# Patient Record
Sex: FEMALE | Race: OTHER | Employment: UNEMPLOYED | URBAN - METROPOLITAN AREA
[De-identification: names, ages, dates, MRNs, and addresses within clinical notes are randomized per-mention and may not be internally consistent; named-entity substitution may affect disease eponyms.]

---

## 2017-02-28 ENCOUNTER — OFFICE VISIT (OUTPATIENT)
Dept: FAMILY MEDICINE CLINIC | Age: 32
End: 2017-02-28

## 2017-02-28 DIAGNOSIS — Z71.3 DIETARY COUNSELING AND SURVEILLANCE: Primary | ICD-10-CM

## 2017-02-28 NOTE — PROGRESS NOTES
Ms. Reyna Beltrán was seen for f/u wt loss nutrition education. No  used for this appt. Current weight 255 lbs (RD scale)  Walking 3 days for 40 minutes. Pt says her heart rate does increase on these walks  24 hour recall performed. Pt eating 5 times/day. CHO heavy  Emphasized importance of variety and again RD encouraged Ms. Reyna Beltrán to keep a food journal for a 3 days to get an idea of where she can improve her meals/snacks. RD provided example of adding spinach to her mozzarella quesadilla and always having vegetable at dinner. Pt is drinking water that has chopped eggplant in it as she was told by a relative that it helps with fat burning. RD provided praise to pt as she has increased her calorie intake but now needs to tweak balance between food groups. Goals:   Using [employer] placemat as a guide, pt will aim for at least 2 servings veggies/day  Pt will add 1 more day of walking to current exercise regimen.

## 2017-04-26 ENCOUNTER — OFFICE VISIT (OUTPATIENT)
Dept: FAMILY MEDICINE CLINIC | Age: 32
End: 2017-04-26

## 2017-04-26 DIAGNOSIS — L71.9 ROSACEA: ICD-10-CM

## 2017-04-26 DIAGNOSIS — L70.9 ACNE, UNSPECIFIED ACNE TYPE: Primary | ICD-10-CM

## 2020-03-05 ENCOUNTER — OFFICE VISIT (OUTPATIENT)
Dept: URGENT CARE | Age: 35
End: 2020-03-05

## 2020-03-05 VITALS — WEIGHT: 254 LBS | HEIGHT: 65 IN | BODY MASS INDEX: 42.32 KG/M2

## 2020-03-05 RX ORDER — ONDANSETRON 4 MG/1
4 TABLET, FILM COATED ORAL
COMMUNITY

## 2020-03-05 RX ORDER — HYDROCODONE BITARTRATE AND ACETAMINOPHEN 5; 325 MG/1; MG/1
TABLET ORAL
COMMUNITY
End: 2020-06-12

## 2020-03-11 ENCOUNTER — OFFICE VISIT (OUTPATIENT)
Dept: SURGERY | Age: 35
End: 2020-03-11

## 2020-03-11 VITALS
RESPIRATION RATE: 16 BRPM | HEART RATE: 78 BPM | OXYGEN SATURATION: 99 % | HEIGHT: 65 IN | WEIGHT: 253 LBS | SYSTOLIC BLOOD PRESSURE: 144 MMHG | TEMPERATURE: 98.5 F | BODY MASS INDEX: 42.15 KG/M2 | DIASTOLIC BLOOD PRESSURE: 98 MMHG

## 2020-03-11 DIAGNOSIS — K80.20 CALCULUS OF GALLBLADDER WITHOUT CHOLECYSTITIS WITHOUT OBSTRUCTION: Primary | ICD-10-CM

## 2020-03-11 PROBLEM — E66.01 CLASS 3 SEVERE OBESITY IN ADULT (HCC): Status: ACTIVE | Noted: 2020-03-11

## 2020-03-11 NOTE — PROGRESS NOTES
Cruzito Leigh is a 38 y.o. female who presents for evaluation of symptomatic cholelithiasis. Information obtained from patient via blue phone . Ms. Jewell Handler tells me that she has been experiencing intermittent abdominal pain for some time now. Pain localized to the epigastrium and occurs after meals. The episodes of pain are becoming more frequent and more severe. Associated abdominal bloating and nausea. No chest pain or shortness of breath. No clear h/o michelle colored stool or tea colored urine. She has otherwise been in her usual state of health. Recently seen in ER where she was found to have cholelithiasis. (Films or report not available.)    Past Medical History:   Diagnosis Date    Calculus of gallbladder without cholecystitis without obstruction 3/11/2020    Class 3 severe obesity in adult [facility]) 3/11/2020     History reviewed. No pertinent surgical history. History reviewed. No pertinent family history. Social History     Socioeconomic History    Marital status:      Spouse name: Not on file    Number of children: Not on file    Years of education: Not on file    Highest education level: Not on file   Tobacco Use    Smoking status: Never Smoker    Smokeless tobacco: Never Used   Substance and Sexual Activity    Alcohol use: No    Drug use: No     Review of systems negative except as noted. Review of Systems   Respiratory: Negative for shortness of breath. Cardiovascular: Negative for chest pain. Gastrointestinal: Positive for abdominal pain (Epigastric.) and nausea. Abdominal bloating. No clear h/o michelle colored stool. Genitourinary: Negative for dysuria and hematuria. No clear h/o tea colored urine. Musculoskeletal: Positive for back pain. Physical Exam  Vitals signs reviewed. Constitutional:       General: She is not in acute distress. Appearance: Normal appearance. She is obese.    HENT:      Head: Normocephalic and atraumatic. Eyes:      General: No scleral icterus. Neck:      Musculoskeletal: Neck supple. Cardiovascular:      Rate and Rhythm: Normal rate and regular rhythm. Pulmonary:      Effort: Pulmonary effort is normal.      Breath sounds: Normal breath sounds. Abdominal:      General: There is no distension. Palpations: Abdomen is soft. Tenderness: There is no abdominal tenderness. There is no guarding or rebound. Musculoskeletal: Normal range of motion. Lymphadenopathy:      Cervical: No cervical adenopathy. Neurological:      General: No focal deficit present. Mental Status: She is alert. ASSESSMENT and PLAN  In view of the findings on H and P, believe that Ms. Getachew Webster's symptoms are due to symptomatic cholelithiasis. Do not have the ultrasound film or report available at this time. Explained to Ms. Nila Mi, via the blue phone , that I need to at least see the ultrasound report before scheduling surgery. Will try to obtain records from outside hospital and will see Ms. Nila Mi next week to discuss/schedule surgery. Discussed plan with her via the blue phone  and she is agreeable.       CC: Soledad Manzano MD

## 2020-03-11 NOTE — PROGRESS NOTES
1. Have you been to the ER, urgent care clinic since your last visit? Hospitalized since your last visit? Yes [facility] ER abd pain    2. Have you seen or consulted any other health care providers outside of the [address] [place] [address] since your last visit? Include any pap smears or colon screening.  No     [employer] blue phones used to interpret visit confirmation number [id number]

## 2020-03-17 ENCOUNTER — OFFICE VISIT (OUTPATIENT)
Dept: SURGERY | Age: 35
End: 2020-03-17

## 2020-03-17 VITALS
SYSTOLIC BLOOD PRESSURE: 124 MMHG | TEMPERATURE: 98.2 F | WEIGHT: 252 LBS | HEIGHT: 65 IN | RESPIRATION RATE: 16 BRPM | BODY MASS INDEX: 41.99 KG/M2 | DIASTOLIC BLOOD PRESSURE: 86 MMHG | OXYGEN SATURATION: 99 % | HEART RATE: 78 BPM

## 2020-03-17 DIAGNOSIS — K80.20 CALCULUS OF GALLBLADDER WITHOUT CHOLECYSTITIS WITHOUT OBSTRUCTION: Primary | ICD-10-CM

## 2020-03-17 NOTE — PROGRESS NOTES
Ms. Blanca Warner returns to discuss laparoscopic cholecystectomy. She was last seen on March 11, 2020 for evaluation of symptomatic cholelithiasis. At that time, the results of her ultrasound were not available. Abdominal Ultrasound -2/29/2020 - Enlarged fatty liver. Cholelithiasis. (By report. Films not available.)  In view of the findings on H and P on March 11, 2020 and ultrasound, Ms. Blanca Warner should benefit from cholecystectomy. Discussed laparoscopic cholecystectomy and intra-operative cholangiogram with her, via the blue phone , including risks of bleeding, infection, CBD injury, conversion to open procedure. She understands and wishes to proceed. I have tentatively scheduled Ms. Blanca Warner for surgery on April 10, 2020 at [address] [place] [address] and will see her back in the office postoperatively. She is agreeable to this plan of action and is most certainly free to contact the office should any questions or concerns arise.       CC: Robbie Roldan MD

## 2020-05-13 RX ORDER — ACETAMINOPHEN 325 MG/1
1000 TABLET ORAL ONCE
Status: CANCELLED | OUTPATIENT
Start: 2020-05-13 | End: 2020-05-14

## 2020-05-13 RX ORDER — BUPIVACAINE HYDROCHLORIDE 2.5 MG/ML
30 INJECTION, SOLUTION EPIDURAL; INFILTRATION; INTRACAUDAL ONCE
Status: CANCELLED | OUTPATIENT
Start: 2020-05-13 | End: 2020-05-13

## 2020-06-01 NOTE — PERIOP NOTES
PATIENT CALLED, USING , AND MADE AWARE OF COVID-19 TESTING NEEDED TO BE DONE WITHIN 96 HOURS OF SURGERY. COVID-19 TESTING APPOINTMENT MADE FOR PATIENT. PATIENT INSTRUCTED ON SELF QUARANTINE BETWEEN TESTING AND ARRIVAL TIME DAY OF SURGERY.

## 2020-06-08 ENCOUNTER — HOSPITAL ENCOUNTER (OUTPATIENT)
Dept: PREADMISSION TESTING | Age: 35
Discharge: HOME | End: 2020-06-08
Payer: SELF-PAY

## 2020-06-08 DIAGNOSIS — Z20.822 ENCOUNTER FOR LABORATORY TESTING FOR COVID-19 VIRUS: ICD-10-CM

## 2020-06-08 PROCEDURE — 87635 SARS-COV-2 COVID-19 AMP PRB: CPT

## 2020-06-09 LAB — SARS-COV-2, COV2NT: NOT DETECTED

## 2020-06-11 ENCOUNTER — ANESTHESIA EVENT (OUTPATIENT)
Dept: SURGERY | Age: 35
End: 2020-06-11
Payer: SELF-PAY

## 2020-06-12 ENCOUNTER — APPOINTMENT (OUTPATIENT)
Dept: GENERAL RADIOLOGY | Age: 35
End: 2020-06-12
Attending: SURGERY
Payer: SELF-PAY

## 2020-06-12 ENCOUNTER — ANESTHESIA (OUTPATIENT)
Dept: SURGERY | Age: 35
End: 2020-06-12
Payer: SELF-PAY

## 2020-06-12 ENCOUNTER — HOSPITAL ENCOUNTER (OUTPATIENT)
Age: 35
Setting detail: OUTPATIENT SURGERY
Discharge: HOME | End: 2020-06-12
Attending: SURGERY | Admitting: SURGERY
Payer: SELF-PAY

## 2020-06-12 VITALS
WEIGHT: 252 LBS | OXYGEN SATURATION: 95 % | RESPIRATION RATE: 16 BRPM | HEART RATE: 82 BPM | BODY MASS INDEX: 41.99 KG/M2 | DIASTOLIC BLOOD PRESSURE: 75 MMHG | HEIGHT: 65 IN | TEMPERATURE: 96.6 F | SYSTOLIC BLOOD PRESSURE: 127 MMHG

## 2020-06-12 DIAGNOSIS — K80.20 CALCULUS OF GALLBLADDER WITHOUT CHOLECYSTITIS WITHOUT OBSTRUCTION: Primary | ICD-10-CM

## 2020-06-12 LAB
GLUCOSE BLD STRIP.AUTO-MCNC: 119 MG/DL (ref 65–100)
HCG UR QL: NEGATIVE
SERVICE CMNT-IMP: ABNORMAL

## 2020-06-12 PROCEDURE — 77030018875 HC APPL CLP LIG4 J&J -B: Performed by: SURGERY

## 2020-06-12 PROCEDURE — 74011250636 HC RX REV CODE- 250/636: Performed by: ANESTHESIOLOGY

## 2020-06-12 PROCEDURE — 74011250636 HC RX REV CODE- 250/636: Performed by: NURSE ANESTHETIST, CERTIFIED REGISTERED

## 2020-06-12 PROCEDURE — 77030020263 HC SOL INJ SOD CL0.9% LFCR 1000ML: Performed by: SURGERY

## 2020-06-12 PROCEDURE — 77030038093 HC CATH CHOLGM OP PMI PRGV-C: Performed by: SURGERY

## 2020-06-12 PROCEDURE — 74011000250 HC RX REV CODE- 250: Performed by: SURGERY

## 2020-06-12 PROCEDURE — 77030008756 HC TU IRR SUC STRY -B: Performed by: SURGERY

## 2020-06-12 PROCEDURE — C1725 CATH, TRANSLUMIN NON-LASER: HCPCS | Performed by: SURGERY

## 2020-06-12 PROCEDURE — 77030037032 HC INSRT SCIS CLICKLLINE DISP STOR -B: Performed by: SURGERY

## 2020-06-12 PROCEDURE — 82962 GLUCOSE BLOOD TEST: CPT

## 2020-06-12 PROCEDURE — 77030040361 HC SLV COMPR DVT MDII -B: Performed by: SURGERY

## 2020-06-12 PROCEDURE — 77030002933 HC SUT MCRYL J&J -A: Performed by: SURGERY

## 2020-06-12 PROCEDURE — 77030020053 HC ELECTRD LAPSCP COVD -B: Performed by: SURGERY

## 2020-06-12 PROCEDURE — 88304 TISSUE EXAM BY PATHOLOGIST: CPT

## 2020-06-12 PROCEDURE — 77030008608 HC TRCR ENDOSC SMTH AMR -B: Performed by: SURGERY

## 2020-06-12 PROCEDURE — 77030010507 HC ADH SKN DERMBND J&J -B: Performed by: SURGERY

## 2020-06-12 PROCEDURE — 76210000017 HC OR PH I REC 1.5 TO 2 HR: Performed by: SURGERY

## 2020-06-12 PROCEDURE — 74011000250 HC RX REV CODE- 250: Performed by: NURSE ANESTHETIST, CERTIFIED REGISTERED

## 2020-06-12 PROCEDURE — 77030031139 HC SUT VCRL2 J&J -A: Performed by: SURGERY

## 2020-06-12 PROCEDURE — 77030008684 HC TU ET CUF COVD -B: Performed by: ANESTHESIOLOGY

## 2020-06-12 PROCEDURE — 74011000258 HC RX REV CODE- 258: Performed by: SURGERY

## 2020-06-12 PROCEDURE — 74011250637 HC RX REV CODE- 250/637: Performed by: ANESTHESIOLOGY

## 2020-06-12 PROCEDURE — 77030032060 HC PWDR HEMSTAT ARISTA ASRB 3GM BARD -C: Performed by: SURGERY

## 2020-06-12 PROCEDURE — 74011250637 HC RX REV CODE- 250/637: Performed by: SURGERY

## 2020-06-12 PROCEDURE — 76010000153 HC OR TIME 1.5 TO 2 HR: Performed by: SURGERY

## 2020-06-12 PROCEDURE — 77030007955 HC PCH ENDOSC SPEC J&J -B: Performed by: SURGERY

## 2020-06-12 PROCEDURE — 74300 X-RAY BILE DUCTS/PANCREAS: CPT

## 2020-06-12 PROCEDURE — 77030011640 HC PAD GRND REM COVD -A: Performed by: SURGERY

## 2020-06-12 PROCEDURE — 74011636320 HC RX REV CODE- 636/320: Performed by: SURGERY

## 2020-06-12 PROCEDURE — 77030020704 HC DISECT ENDOSC BLNT J&J -B: Performed by: SURGERY

## 2020-06-12 PROCEDURE — 74011000250 HC RX REV CODE- 250: Performed by: ANESTHESIOLOGY

## 2020-06-12 PROCEDURE — 76060000034 HC ANESTHESIA 1.5 TO 2 HR: Performed by: SURGERY

## 2020-06-12 PROCEDURE — 76210000020 HC REC RM PH II FIRST 0.5 HR: Performed by: SURGERY

## 2020-06-12 PROCEDURE — 77030012770 HC TRCR OPT FX AMR -B: Performed by: SURGERY

## 2020-06-12 PROCEDURE — 77030027743 HC APPL F/HEMSTAT BARD -B: Performed by: SURGERY

## 2020-06-12 PROCEDURE — 77030026438 HC STYL ET INTUB CARD -A: Performed by: ANESTHESIOLOGY

## 2020-06-12 PROCEDURE — 81025 URINE PREGNANCY TEST: CPT

## 2020-06-12 PROCEDURE — C1769 GUIDE WIRE: HCPCS | Performed by: SURGERY

## 2020-06-12 RX ORDER — DEXAMETHASONE SODIUM PHOSPHATE 4 MG/ML
INJECTION, SOLUTION INTRA-ARTICULAR; INTRALESIONAL; INTRAMUSCULAR; INTRAVENOUS; SOFT TISSUE AS NEEDED
Status: DISCONTINUED | OUTPATIENT
Start: 2020-06-12 | End: 2020-06-12 | Stop reason: HOSPADM

## 2020-06-12 RX ORDER — SODIUM CHLORIDE 0.9 % (FLUSH) 0.9 %
5-40 SYRINGE (ML) INJECTION AS NEEDED
Status: DISCONTINUED | OUTPATIENT
Start: 2020-06-12 | End: 2020-06-12 | Stop reason: HOSPADM

## 2020-06-12 RX ORDER — PROPOFOL 10 MG/ML
INJECTION, EMULSION INTRAVENOUS AS NEEDED
Status: DISCONTINUED | OUTPATIENT
Start: 2020-06-12 | End: 2020-06-12 | Stop reason: HOSPADM

## 2020-06-12 RX ORDER — OXYCODONE HYDROCHLORIDE 5 MG/1
5 TABLET ORAL AS NEEDED
Status: DISCONTINUED | OUTPATIENT
Start: 2020-06-12 | End: 2020-06-12 | Stop reason: HOSPADM

## 2020-06-12 RX ORDER — OXYCODONE HYDROCHLORIDE 5 MG/1
5 TABLET ORAL
Qty: 10 TAB | Refills: 0 | Status: SHIPPED | OUTPATIENT
Start: 2020-06-12 | End: 2020-06-15

## 2020-06-12 RX ORDER — MIDAZOLAM HYDROCHLORIDE 1 MG/ML
INJECTION, SOLUTION INTRAMUSCULAR; INTRAVENOUS AS NEEDED
Status: DISCONTINUED | OUTPATIENT
Start: 2020-06-12 | End: 2020-06-12 | Stop reason: HOSPADM

## 2020-06-12 RX ORDER — FENTANYL CITRATE 50 UG/ML
50 INJECTION, SOLUTION INTRAMUSCULAR; INTRAVENOUS AS NEEDED
Status: DISCONTINUED | OUTPATIENT
Start: 2020-06-12 | End: 2020-06-12 | Stop reason: HOSPADM

## 2020-06-12 RX ORDER — ACETAMINOPHEN 500 MG
1000 TABLET ORAL ONCE
Status: COMPLETED | OUTPATIENT
Start: 2020-06-12 | End: 2020-06-12

## 2020-06-12 RX ORDER — SODIUM CHLORIDE 0.9 % (FLUSH) 0.9 %
5-40 SYRINGE (ML) INJECTION EVERY 8 HOURS
Status: DISCONTINUED | OUTPATIENT
Start: 2020-06-12 | End: 2020-06-12 | Stop reason: HOSPADM

## 2020-06-12 RX ORDER — DIPHENHYDRAMINE HYDROCHLORIDE 50 MG/ML
12.5 INJECTION, SOLUTION INTRAMUSCULAR; INTRAVENOUS AS NEEDED
Status: DISCONTINUED | OUTPATIENT
Start: 2020-06-12 | End: 2020-06-12 | Stop reason: HOSPADM

## 2020-06-12 RX ORDER — SODIUM CHLORIDE 9 MG/ML
25 INJECTION, SOLUTION INTRAVENOUS CONTINUOUS
Status: DISCONTINUED | OUTPATIENT
Start: 2020-06-12 | End: 2020-06-12 | Stop reason: HOSPADM

## 2020-06-12 RX ORDER — ONDANSETRON 2 MG/ML
INJECTION INTRAMUSCULAR; INTRAVENOUS AS NEEDED
Status: DISCONTINUED | OUTPATIENT
Start: 2020-06-12 | End: 2020-06-12 | Stop reason: HOSPADM

## 2020-06-12 RX ORDER — ONDANSETRON 2 MG/ML
4 INJECTION INTRAMUSCULAR; INTRAVENOUS AS NEEDED
Status: DISCONTINUED | OUTPATIENT
Start: 2020-06-12 | End: 2020-06-12 | Stop reason: HOSPADM

## 2020-06-12 RX ORDER — ACETAMINOPHEN 325 MG/1
650 TABLET ORAL ONCE
Status: DISCONTINUED | OUTPATIENT
Start: 2020-06-12 | End: 2020-06-12 | Stop reason: SDUPTHER

## 2020-06-12 RX ORDER — GLYCOPYRROLATE 0.2 MG/ML
INJECTION INTRAMUSCULAR; INTRAVENOUS AS NEEDED
Status: DISCONTINUED | OUTPATIENT
Start: 2020-06-12 | End: 2020-06-12

## 2020-06-12 RX ORDER — SUCCINYLCHOLINE CHLORIDE 20 MG/ML
INJECTION INTRAMUSCULAR; INTRAVENOUS AS NEEDED
Status: DISCONTINUED | OUTPATIENT
Start: 2020-06-12 | End: 2020-06-12 | Stop reason: HOSPADM

## 2020-06-12 RX ORDER — FENTANYL CITRATE 50 UG/ML
25 INJECTION, SOLUTION INTRAMUSCULAR; INTRAVENOUS
Status: DISCONTINUED | OUTPATIENT
Start: 2020-06-12 | End: 2020-06-12 | Stop reason: HOSPADM

## 2020-06-12 RX ORDER — MORPHINE SULFATE 10 MG/ML
2 INJECTION, SOLUTION INTRAMUSCULAR; INTRAVENOUS
Status: DISCONTINUED | OUTPATIENT
Start: 2020-06-12 | End: 2020-06-12 | Stop reason: HOSPADM

## 2020-06-12 RX ORDER — MIDAZOLAM HYDROCHLORIDE 1 MG/ML
0.5 INJECTION, SOLUTION INTRAMUSCULAR; INTRAVENOUS
Status: DISCONTINUED | OUTPATIENT
Start: 2020-06-12 | End: 2020-06-12 | Stop reason: HOSPADM

## 2020-06-12 RX ORDER — HYDROMORPHONE HYDROCHLORIDE 1 MG/ML
0.2 INJECTION, SOLUTION INTRAMUSCULAR; INTRAVENOUS; SUBCUTANEOUS
Status: DISCONTINUED | OUTPATIENT
Start: 2020-06-12 | End: 2020-06-12 | Stop reason: HOSPADM

## 2020-06-12 RX ORDER — MIDAZOLAM HYDROCHLORIDE 1 MG/ML
1 INJECTION, SOLUTION INTRAMUSCULAR; INTRAVENOUS AS NEEDED
Status: DISCONTINUED | OUTPATIENT
Start: 2020-06-12 | End: 2020-06-12 | Stop reason: HOSPADM

## 2020-06-12 RX ORDER — PHENYLEPHRINE HCL IN 0.9% NACL 0.4MG/10ML
SYRINGE (ML) INTRAVENOUS AS NEEDED
Status: DISCONTINUED | OUTPATIENT
Start: 2020-06-12 | End: 2020-06-12 | Stop reason: HOSPADM

## 2020-06-12 RX ORDER — SODIUM CHLORIDE, SODIUM LACTATE, POTASSIUM CHLORIDE, CALCIUM CHLORIDE 600; 310; 30; 20 MG/100ML; MG/100ML; MG/100ML; MG/100ML
25 INJECTION, SOLUTION INTRAVENOUS CONTINUOUS
Status: DISCONTINUED | OUTPATIENT
Start: 2020-06-12 | End: 2020-06-12 | Stop reason: HOSPADM

## 2020-06-12 RX ORDER — FENTANYL CITRATE 50 UG/ML
INJECTION, SOLUTION INTRAMUSCULAR; INTRAVENOUS AS NEEDED
Status: DISCONTINUED | OUTPATIENT
Start: 2020-06-12 | End: 2020-06-12 | Stop reason: HOSPADM

## 2020-06-12 RX ORDER — ACETAMINOPHEN 500 MG
1000 TABLET ORAL
Qty: 20 TAB | Refills: 1 | Status: SHIPPED | OUTPATIENT
Start: 2020-06-12

## 2020-06-12 RX ORDER — NEOSTIGMINE METHYLSULFATE 1 MG/ML
INJECTION, SOLUTION INTRAVENOUS AS NEEDED
Status: DISCONTINUED | OUTPATIENT
Start: 2020-06-12 | End: 2020-06-12

## 2020-06-12 RX ORDER — LIDOCAINE HYDROCHLORIDE 10 MG/ML
0.1 INJECTION, SOLUTION EPIDURAL; INFILTRATION; INTRACAUDAL; PERINEURAL AS NEEDED
Status: DISCONTINUED | OUTPATIENT
Start: 2020-06-12 | End: 2020-06-12 | Stop reason: HOSPADM

## 2020-06-12 RX ORDER — SODIUM CHLORIDE, SODIUM LACTATE, POTASSIUM CHLORIDE, CALCIUM CHLORIDE 600; 310; 30; 20 MG/100ML; MG/100ML; MG/100ML; MG/100ML
125 INJECTION, SOLUTION INTRAVENOUS CONTINUOUS
Status: DISCONTINUED | OUTPATIENT
Start: 2020-06-12 | End: 2020-06-12 | Stop reason: HOSPADM

## 2020-06-12 RX ORDER — ROCURONIUM BROMIDE 10 MG/ML
INJECTION, SOLUTION INTRAVENOUS AS NEEDED
Status: DISCONTINUED | OUTPATIENT
Start: 2020-06-12 | End: 2020-06-12 | Stop reason: HOSPADM

## 2020-06-12 RX ORDER — BUPIVACAINE HYDROCHLORIDE 2.5 MG/ML
30 INJECTION, SOLUTION EPIDURAL; INFILTRATION; INTRACAUDAL ONCE
Status: COMPLETED | OUTPATIENT
Start: 2020-06-12 | End: 2020-06-12

## 2020-06-12 RX ORDER — LIDOCAINE HYDROCHLORIDE 20 MG/ML
INJECTION, SOLUTION EPIDURAL; INFILTRATION; INTRACAUDAL; PERINEURAL AS NEEDED
Status: DISCONTINUED | OUTPATIENT
Start: 2020-06-12 | End: 2020-06-12 | Stop reason: HOSPADM

## 2020-06-12 RX ADMIN — Medication 80 MCG: at 12:25

## 2020-06-12 RX ADMIN — Medication 40 MCG: at 12:26

## 2020-06-12 RX ADMIN — DEXAMETHASONE SODIUM PHOSPHATE 4 MG: 4 INJECTION, SOLUTION INTRAMUSCULAR; INTRAVENOUS at 12:05

## 2020-06-12 RX ADMIN — MIDAZOLAM HYDROCHLORIDE 0.5 MG: 2 INJECTION, SOLUTION INTRAMUSCULAR; INTRAVENOUS at 14:00

## 2020-06-12 RX ADMIN — ACETAMINOPHEN 1000 MG: 500 TABLET ORAL at 11:12

## 2020-06-12 RX ADMIN — MIDAZOLAM HYDROCHLORIDE 0.5 MG: 2 INJECTION, SOLUTION INTRAMUSCULAR; INTRAVENOUS at 14:04

## 2020-06-12 RX ADMIN — CEFOTETAN DISODIUM 2 G: 2 INJECTION, POWDER, FOR SOLUTION INTRAMUSCULAR; INTRAVENOUS at 11:53

## 2020-06-12 RX ADMIN — LIDOCAINE HYDROCHLORIDE 80 MG: 20 INJECTION, SOLUTION EPIDURAL; INFILTRATION; INTRACAUDAL; PERINEURAL at 11:47

## 2020-06-12 RX ADMIN — MIDAZOLAM 2 MG: 1 INJECTION INTRAMUSCULAR; INTRAVENOUS at 11:36

## 2020-06-12 RX ADMIN — ONDANSETRON 4 MG: 2 INJECTION INTRAMUSCULAR; INTRAVENOUS at 13:43

## 2020-06-12 RX ADMIN — ROCURONIUM BROMIDE 30 MG: 10 SOLUTION INTRAVENOUS at 11:52

## 2020-06-12 RX ADMIN — FENTANYL CITRATE 25 MCG: 50 INJECTION INTRAMUSCULAR; INTRAVENOUS at 14:55

## 2020-06-12 RX ADMIN — Medication 80 MCG: at 12:23

## 2020-06-12 RX ADMIN — FENTANYL CITRATE 25 MCG: 50 INJECTION INTRAMUSCULAR; INTRAVENOUS at 14:40

## 2020-06-12 RX ADMIN — SODIUM CHLORIDE, POTASSIUM CHLORIDE, SODIUM LACTATE AND CALCIUM CHLORIDE: 600; 310; 30; 20 INJECTION, SOLUTION INTRAVENOUS at 10:38

## 2020-06-12 RX ADMIN — FENTANYL CITRATE 100 MCG: 50 INJECTION, SOLUTION INTRAMUSCULAR; INTRAVENOUS at 11:47

## 2020-06-12 RX ADMIN — MIDAZOLAM HYDROCHLORIDE 0.5 MG: 2 INJECTION, SOLUTION INTRAMUSCULAR; INTRAVENOUS at 13:54

## 2020-06-12 RX ADMIN — OXYCODONE 5 MG: 5 TABLET ORAL at 15:07

## 2020-06-12 RX ADMIN — ROCURONIUM BROMIDE 5 MG: 10 SOLUTION INTRAVENOUS at 11:47

## 2020-06-12 RX ADMIN — SUCCINYLCHOLINE CHLORIDE 160 MG: 20 INJECTION, SOLUTION INTRAMUSCULAR; INTRAVENOUS at 11:48

## 2020-06-12 RX ADMIN — SODIUM CHLORIDE 10 MG: 9 INJECTION INTRAMUSCULAR; INTRAVENOUS; SUBCUTANEOUS at 14:28

## 2020-06-12 RX ADMIN — PROPOFOL 200 MG: 10 INJECTION, EMULSION INTRAVENOUS at 11:47

## 2020-06-12 RX ADMIN — ONDANSETRON HYDROCHLORIDE 4 MG: 2 INJECTION, SOLUTION INTRAMUSCULAR; INTRAVENOUS at 12:05

## 2020-06-12 NOTE — PERIOP NOTES
Intraoperative hemostatic agents:    Иван absorbable hemostatic particles  Ref #: [id number]  Lot: [id number]  Exp: 09/28/2024

## 2020-06-12 NOTE — DISCHARGE INSTRUCTIONS
Patient Discharge Instructions    Rox Kendrick / [id number] : 1985    Admitted 2020 Discharged: 2020       · It is important that you take the medication exactly as they are prescribed. · Keep your medication in the bottles provided by the pharmacist and keep a list of the medication names, dosages, and times to be taken in your wallet. · Do not take other medications without consulting your doctor. What to do at Home    Recommended diet: Regular. Recommended activity: No Restrictions. No Driving While Taking Oxycodone. Tylenol 1000mg every 6 hours as needed for pain. Ice pack to abdomen as needed. Oxycodone as needed for severe pain. May Take Shower or [place] Roxo. If you experience any of the following symptoms Fevers, Chills, Nausea, Vomitting, Redness or Drainage at Surgical Site(s) or Any Other Questions or Concerns Please Call -  [phone]. Follow-up with Dr. Ramon Garcia in 10-14 days. Information obtained by :  I understand that if any problems occur once I am at home I am to contact my physician. I understand and acknowledge receipt of the instructions indicated above. Physician's or R.N.'s Signature                                                                  Date/Time                                                                                                                                              Patient or Representative Signature                                                          Date/Time        Patient Education        Colecistectomía: Konstantin Tuttle en el hogar  Cholecystectomy: What to Expect at Home  [place] recuperación  [address] [place] [address], es normal sentirse débil y fatigado por varios días después de regresar al hogar. Es posible que tenga el abdomen hinchado.  Si le braga hecho rachana cirugía laparoscópica, también podría sentir dolor en el hombro sowmya unas 24 horas. Es posible que tenga gases o necesite eructar mucho al principio, y a [address]. La diarrea suele desaparecer en el transcurso de 2 a 4 semanas, destini podría durar más. El tiempo que tarde en recuperarse depende de si le braga hecho rachana cirugía laparoscópica o abierta. · En el jessy de [facility] lapar[facility]ica, la mayoría de las personas pueden volver a trabajar o hacer bales rutina habitual en 1 a 2 semanas, destini podría tardar más, según el tipo de trabajo que navid. · En el jessy de [place], es probable que tarde de 4 a 6 semanas en poder volver a bales rutina habitual.  Esta hoja de [employer] idea general del tiempo que le llevará recuperarse. No obstante, cada persona se recupera a un ritmo diferente. Siga los pasos que se mencionan a continuación para recuperarse lo más rápido posible. ¿Cómo puede cuidarse en el hogar? Actividad  · Descanse cuando se sienta fatigado. Dormir lo suficiente le ayudará a recuperarse. · Intente caminar todos los días. Comience caminando un poco más de lo que caminó el día anterior. Aumente en forma gradual la distancia que camina. Caminar aumenta el flujo de [place] y [place] a prevenir la neumonía y el estreñimiento. · Evite levantar cualquier cosa que implique un esfuerzo sowmya unas 2 a 4 semanas. [place] podría incluir un marcial, bolsas de las compras y envases de AT[employer]T pesados, mochilas o maletines pesados, bolsas de arena para excremento de nirmal o alimentos para perros, o rachana aspiradora. · Evite actividades vigorosas, isaias montar en bicicleta, trotar, levantar pesas y hacer ejercicio aeróbico, hasta que bales médico lo apruebe. · Puede ducharse entre 24 y 50 horas después de la [place], si bales médico lo [place]. Seque el mary (incisión) con toques suaves de toalla.  No tome neha de inmersión sowmya las primeras 2 semanas o hasta que bales médico lo apruebe. · Puede conducir cuando ya no esté tomando analgésicos (medicamentos para el dolor) y pueda desplazar con rapidez bales pie del acelerador al freno. También debe estar en condiciones de poder permanecer sentado con comodidad sowmya un tiempo selam, aun si no piensa ir muy lejos. Podría quedar atascado en el tráfico.  · Para la cirugía laparoscópica, la mayoría de las personas pueden volver a trabajar o hacer bales rutina normal en 1 a 2 semanas, aunque podrían [employer]. En el jessy de [place], es probable que tarde de 4 a 6 semanas en poder volver a bales rutina habitual.  · Bales médico le indicará cuándo puede volver a tener relaciones sexuales. Dieta   · Coma cantidades más pequeñas varias veces al día en lugar de pocas veces y en grandes cantidades. Puede seguir rachana dieta normal, destini evite los alimentos grasosos por 1 mes aproximadamente. Entre los alimentos grasosos se [employer], la [place], [place] queso y muchos aperitivos. Si tiene [employer], coma alimentos suaves bajos en grasa, isaias arroz sin condimentar, en a la [place], pan moni y yogur. · Negar abundantes líquidos (a menos que bales médico le indique lo contrario). · Si tiene diarrea, evite los alimentos condimentados, los productos lácteos, los alimentos grasosos y el alcohol. También puede observar si la causa es algún alimento en particular y dejar de comerlo. Si la diarrea [employer] de 2 semanas, hable con bales médico.  · Podría notar que no evacua el intestino con regularidad jenni después de la [place]. [place] es común. Trate de evitar el estreñimiento y de no hacer esfuerzos cuando evacua el intestino. Sería conveniente nancy un suplemento de [employer]. Si no ha evacuado el intestino después de un par de días, pregúntele a bales médico si puede nancy un laxante suave. Medicamentos  · Bales médico le dirá si puede volver a nancy leonel medicamentos y cuándo puede volver a hacerlo. También le dará indicaciones sobre cualquier medicamento nuevo que deba nancy usted. · Si aakash aspirina o cualquier otro medicamento que previene los coágulos de [place], pregúntele a bales médico si debería volver a tomarlo y en qué momento. Asegúrese de entender exactamente lo que bales médico quiere que navid. · [employer] analgésicos exactamente isaias le fueron indicados. ? Si el médico le recetó analgésicos, tómelos según las indicaciones. ? Si no está tomando analgésicos recetados, tome carol de venta dani, kailyn isaias acetaminofén (Tylenol), ibuprofeno (Advil, Motrin) o naproxeno (Aleve). Mai y siga todas las instrucciones de la [place]. ? No tome dos o más analgésicos al [employer], a menos que el médico se lo haya indicado. Muchos analgésicos contienen acetaminofén, es decir, Tylenol. El exceso de Tylenol puede ser dañino. · Si le parece que el analgésico le está produciendo revoltura del estómago:  ? [place] el medicamento después de las comidas (a menos que bales médico le indique lo contrario). ? Pídale al médico un analgésico diferente. · Si bales médico le recetó antibióticos, tómelos según las indicaciones. No deje de tomarlos por el hecho de sentirse mejor. Debe nancy todos los antibióticos hasta terminarlos. Cuidado de la incisión  · Si le braga puesto tiras de cinta [employer]oco[employer] incisión, o mary, déjeselas puestas sowmya rachana semana o hasta que se caigan por sí solas. · Después de 24 a 48 horas, lave la quique a diario con [place] tibia y séquela con toques suaves de toalla. · Es posible que tenga grapas para mantener el mary cerrado. Manténgalas secas hasta que bales [address] [facility] [address]. [place] es por lo general en 7 a 10 días. · Mantenga la quique limpia y seca. Puede cubrirla con rachana venda de gasa si supura o roza contra la ropa. Cambie la venda todos los edson. Hielo   · Para reducir la hinchazón y el dolor, colóquese hielo o rachana compresa fría sobre el abdomen sowmya 10 a 20 minutos cada vez.  Alana Vang cada 1 a 2 horas. Póngase un paño asles entre el hielo y la piel. La atención de seguimiento es rachana parte clave de bales tratamiento y seguridad. Asegúrese de hacer y acudir a todas las citas, y llame a bales médico si está teniendo problemas. También es rachana buena idea saber los resultados de leonel exámenes y mantener rachana lista de los medicamentos que aakash. ¿Cuándo debe pedir ayuda? Llame [id number] en cualquier momento que considere que necesita atención de [place]. Por ejemplo, llame si:  · Se desmayó (perdió el conocimiento). · [address] dificultades para respirar. · Tiene dolor repentino en el pecho y falta de [place], o tose adali. Llame a bales médico ahora mismo o busque atención médica inmediata si:  · Siente el estómago revuelto y no puede beber líquidos. · Tiene dolor que no mejora después de nancy analgésicos. · [address], tales isaias:  ? Aumento del dolor, la hinchazón, el enrojecimiento o la temperatura. ? Vetas rojizas que salen de la incisión. ? Pus que supura de la incisión. ? Ganglios linfáticos inflamados en el ailyn, las axilas o la jose juan. ? Anthony Ty. · La orina es de color amarronado oscuro o las heces son de color mac o del color de la arcilla. · La piel o la parte manfred de los ojos se le ponen amarillentas. · La venda colocada sobre la incisión se empapa con adali de color jackson vivo. · Tiene señales de un coágulo de [place], tales isaias:  ? Dolor en la pantorrilla, el muslo, la jose juan o detrás de la rodilla. ? Enrojecimiento e hinchazón en la pierna o la jose juan. · Tiene problemas para orinar o evacuar el intestino, en especial si tiene dolor leve o hinchazón en la parte baja del abdomen. Preste especial atención a cualquier cambio en bales devora y asegúrese de comunicarse con bales médico si:  · Le braga hecho rachana Maico Purpura y el dolor en el hombro dura más de 24 horas. · No evacua el intestino después de nancy un laxante.   ¿Dónde puede encontrar más información en [facility]? Vaya a [email or web address]  Grace F357 en la búsqueda para aprender Toula Manners de \"Colecistectomía: Qué esperar en el hogar. \"  Revisado: 12 agosto, 3012               OMULWZP del contenido: 12.5  © 8581-7966 Healthwise, Incorporated. Las instrucciones de cuidado fueron adaptadas bajo licencia por Good Help Connections (which disclaims liability or warranty for this information). Si usted tiene [place] afección médica o sobre estas instrucciones, siempre pregunte a bales profesional de devora. Healthwise, Incorporated niega toda garantía o responsabilidad por bales uso de esta información. Patient Education        Aprenda sobre el coronavirus (CUODX-26)  Learning About Coronavirus (COVID-19)  Coronavirus ([id number]): Generalidades  ¿Qué es el coronavirus (HCINP-50)? La enfermedad por coronavirus (COVID-19) es causada por un virus. Es rachana enfermedad que se detectó por Elaine Binghamjakob en [facility] en [place] de 2019. Desde entonces se ha extendido por todo el jennie. El virus puede causar [place], tos y dificultad para respirar. En casos graves, puede causar neumonía y hacer que sea difícil respirar sin [facility]. Puede causar la muerte. Los coronavirus son un gran seble de virus. Provocan el resfriado. También causan enfermedades más graves isaias el síndrome respiratorio de [place] (MERS, por leonel siglas en inglés) y el síndrome respiratorio ambika grave (SARS, por leonel siglas en inglés). COVID-19 es causada por un nuevo coronavirus. [place] significa que es un nuevo tipo que no se había visto antes [facility]. El virus se transmite de persona a persona a través de gotitas por toser y estornudar. También puede transmitirse cuando usted está cerca de rachana persona que está infectada. Y puede transmitirse cuando toca algo que contiene el virus, isaias el pomo de rachana zulay o rachana perez. ¿Qué puede usted hacer para protegerse del coronavirus (FIYUV-53)?   La mejor [place] de evitar enfermarse es hacer lo siguiente:  · Evitar las áreas en las que haya un brote. · Evitar el contacto con personas que pudieran estar infectadas. · Susan Arvind anastasiya a menudo con jabón o desinfectantes de anastasiya a base de alcohol. · [address] [facility] y tratar de mantenerse al menos a 6 pies de distancia de otras personas. · Susan Samuels anastasiya a menudo, especialmente después de toser o estornudar. Use agua y Eleanor, y frótese las anastasiya por al menos 20 segundos. Si no tiene a bales alcance agua y jabón, use un desinfectante de anastasiya a base de alcohol. · Evitar tocarse la boca, la nariz y los ojos. ¿Qué puede usted hacer para evitar contagiar el virus a otras personas? Para ayudar a evitar la transmisión del virus a otras personas:  · Cúbrase la boca con un pañuelo de papel cuando tosa o estornude. Luego tire el pañuelo a la basura. · Use un desinfectante para limpiar las cosas que toca con frecuencia. · Use rachana cubierta facial de daniel si tiene que ir a lugares públicos. · Quédese en casa si está enfermo o se ha expuesto al virus. No vaya a la escuela, al [place] ni a lugares públicos. Y no use el transporte público, transportes compartidos ni taxis a menos que no tenga otra opción. · Si está enfermo:  ? Salga de bales casa solamente si tiene que obtener [address] [facility]. Tere llame al consultorio médico jayna para que sepan que está por ir. Y use rachana cubierta facial.  ? Use la cubierta facial toda vez que esté en presencia de otras personas. Puede ayudar a detener la propagación del virus cuando tose o estornuda. ? Limpie y desinfecte bales hogar todos los días. Use limpiadores domésticos y toallitas o aerosoles desinfectantes. Tenga especial cuidado de limpiar las cosas que sujeta con las [place]. Estas incluyen perillas de luh, controles remotos, teléfonos y manijas del refrigerador y el microondas. Y no olvide las encimeras, [place], neha y teclados de computadora.   Cuándo pedir ayuda  Llame [id number] en cualquier momento que considere que necesita atención de [place]. Por ejemplo, llame si:  · Tiene grave dificultad para respirar. (No puede hablar en absoluto). · Tiene dolor o presión jj en el pecho. · Está muy mareado o aturdido. · Está confuso o no puede pensar con claridad. · Tiene un tinte [employer] brittany y en los labios. · Se desmaya (pierde el conocimiento) o tiene muchas dificultades para despertarse. Llame a bales médico ahora mismo si comienza a presentar síntomas isaias:  · Falta de aire. · [place] Exon. · Tos. Si necesita obtener [employer], llame ajyna al [employer] del médico para que le den instrucciones antes de ir. Asegúrese de usar rachana cubierta facial para evitar exponer a otras personas al virus. ¿Dónde puede obtener información actualizada? Las siguientes organizaciones de devora están siguiendo y estudiando praveen virus. Leonel sitios web contienen la información más actualizada. También obtendrá información sobre qué hacer si jennifer que puede haberse expuesto al virus. · Centros para el Control y la Prevención de Enfermedades de los EE. UU. (CDC, por leonel siglas en inglés): Los CDC proporcionan noticias actualizadas sobre la enfermedad y consejos para viajeros. El sitio web también le informa cómo prevenir la propagación de la infección. www.cdc.gov  · Organización Mundial de la Devora (OMS): La OMS ofrece información sobre los brotes del virus. La OMS también jagdish consejos para viajeros. www.who.int  Revisado: 8 benito, 2020               Versión del contenido: 12.5  © 6231-2051 Healthwise, Incorporated. Las instrucciones de cuidado fueron adaptadas bajo licencia por Good Help Connections (which disclaims liability or warranty for this information). Si usted tiene [place] afección médica o sobre estas instrucciones, siempre pregunte a bales profesional de devora. [employer], Incorporated niega toda garantía o responsabilidad por bales uso de esta información.        Patient Education        Sedación para un procedimiento médico: Instrucciones de cuidado  Sedation for a Medical Procedure: Care Instructions  Instrucciones de cuidado    Para un procedimiento o rachana cirugía menores, se le administrará un sedante para ayudarle a relajarse. Pili Merck & [employer] hará sentir sueño. Suele administrarse en rachana vena (por vía intravenosa o IV). Puede usarse con anestesia. Existen diferentes tipos de anestesia. Usted y bales médico o el anestesista colaborarán para decidir cuál es la mejor anestesia para usted. Suele basarse en bales devora, el procedimiento y bales preferencia. · La anestesia local es rachana inyección que se aplica para entumecer rachana pequeña parte del cuerpo. · La anestesia regional es rachana inyección que bloquea el dolor en rachana quique más thomas del cuerpo. · La anestesia general afecta el cerebro y todo el cuerpo. Usted la recibe a través de un pequeño tubo colocado en rachana vena (IV). O podría inhalarla. Usted está inconsciente y no sentirá dolor. Usted puede recibir anestesia monitoreada (MAC, por leonel siglas en inglés). [place] significa que un anestesista lo atenderá [employer]. Él o samaria se asegurará de que usted solamente reciba el nivel de tratamiento con anestesia que necesita para prevenir el dolor en bales jessy específico.  Si le dieron anestesia, usted puede sentir algo de dolor y molestias a medida que [employer]. Si tiene dolor, no tenga miedo de decirlo. El medicamento para el dolor funciona mejor si lo aakash antes de que el dolor se vuelva intenso. Los efectos secundarios comunes de la sedación incluyen:  · Sentirse somnoliento (con sueño). (Cyn[place] y las enfermeras se asegurarán de que no esté demasiado somnoliento para poder irse a casa). · Náuseas y vómito. Estos no suelen durar por [employer]. · Sensación de cansancio. La atención de seguimiento es rachana parte clave de bales tratamiento y seguridad.  Asegúrese de hacer y acudir a todas las citas, y llame a bales médico si está teniendo problemas. También es rachana buena idea saber los resultados de leonel exámenes y mantener rachana lista de los medicamentos que aakash. ¿Cómo puede cuidarse en el hogar? Actividad  · Por 24 horas, no navid nada que requiera prestar atención a detalles. [place] incluye ir a trabajar, nancy decisiones importantes o firmar cualquier documento legal. Lleva tiempo hasta que los efectos del medicamento desaparezcan por completo. · Para bales seguridad, no debe conducir ni operar maquinaria que pueda ser peligrosa hasta que los efectos del medicamento desaparezcan y usted pueda pensar con claridad y reaccionar con facilidad. · Cuando regrese a casa, es importante que descanse hasta que desaparezca el efecto de la anestesia. Algunas personas se sentirán somnolientas o mareadas por algunas horas después de salir del hospital.  · Tómese bales tiempo y camine lentamente. Los cambios súbitos de posición también pueden causar náuseas. · Descanse cuando se sienta cansado. Dormir lo suficiente le ayudará a recuperarse. Alimentación  · Puede seguir con bales dieta normal, a menos que el [employer] dé otras instrucciones. Si tiene [employer], pruebe a nancy líquidos nneka y a comer alimentos suaves y bajos en grasa isaias pan moni o arroz sin sazonar. · Negar mucho líquido (a menos que bales médico le diga que no lo navid). · No negar alcohol por 24 horas. Medicamentos  · Sea harry con los medicamentos. Mai y siga todas las instrucciones de la [place]. ? Si el médico le recetó un analgésico (medicamento para el dolor), tómelo según las indicaciones. ? Si está tomando opioides para el dolor, es muy importante que los tome según las indicaciones. Los opioides puede usarse indebidamente con facilidad. El uso indebido puede llevar a un trastorno de uso de opioides e incluso a la muerte. Debido a esto, es mejor dejar de usarlos lo más pronto posible.  En cuanto no los necesite, hable con bales médico acerca de cómo dejar de tomarlos en forma chaudhari. También hable con bales médico acerca de cómo guardar los opioides y deshacerse de ellos en forma chaudhari. ? Si no está tomando un analgésico recetado, pregúntele a bales médico si puede nancy carol de [employer]. · Si jennifer que bales analgésico le está causando malestar estomacal, puede probar estas cosas. ? [employer] después de las comidas (a menos que bales médico le haya dicho que no lo navid). ? Pídale a bales médico un analgésico diferente. ¿Cuándo debe pedir ayuda? Llame [id number] en cualquier momento que considere que puede necesitar atención de [place]. Por ejemplo, llame si:  · Tiene graves dificultades para respirar. · Se desmayó (perdió el conocimiento). Llame a bales médico ahora mismo o busque atención médica inmediata si:  · Tiene dificultades para respirar. · Tiene náuseas o vómito que persisten o [place]. · Tiene fiebre. · Tiene un dolor de yair nuevo o peor. · El efecto del medicamento no se va y no puede pensar claramente. Preste especial atención a los cambios en bales devora y asegúrese de comunicarse con bales médico si:  · No mejora isaias se esperaba. ¿Dónde puede encontrar más información en inglés? Vaya a [email or web address]  Diana Cardinal [id number] en la búsqueda para aprender más acerca de \"Sedación para un procedimiento médico: Instrucciones de cuidado. \"  Revisado: 22 jose luis, 4688               BNXGWBJ del contenido: 12.5  © 4014-8596 Healthwise, Incorporated. Las instrucciones de cuidado fueron adaptadas bajo licencia por Good Help Connections (which disclaims liability or warranty for this information). Si usted tiene [place] afección médica o sobre estas instrucciones, siempre pregunte a bales profesional de devora. [facility], Incorporated niega toda garantía o responsabilidad por bales uso de esta información.

## 2020-06-12 NOTE — OP NOTES
[address]   OPERATIVE REPORT    Name:  Julia Crain  MR#:  [id number]  :  1985  ACCOUNT #:  [de-identified]  DATE OF SERVICE:  2020    PREOPERATIVE DIAGNOSIS:  Symptomatic cholelithiasis. POSTOPERATIVE DIAGNOSIS:  Symptomatic cholelithiasis. PROCEDURES PERFORMED:  1. Laparoscopic cholecystectomy. 2.  Intraoperative cholangiogram.    SURGEON:  Chantell Rojas MD    ASSISTANT:  Leonidas Sears SA    ANESTHESIA:  General endotracheal.    COMPLICATIONS:  None. SPECIMENS REMOVED:  Gallbladder to pathology. IMPLANTS:  None. ESTIMATED BLOOD LOSS:  Approximately 20 mL. IV FLUIDS:  Crystalloids 600 mL. DRAINS:  None. INDICATIONS FOR SURGERY:  The patient is a 65-year-old female with symptomatic cholelithiasis. Ms. Robel Montoya is brought to the operating at this time for laparoscopic cholecystectomy and intraoperative cholangiogram.  The risks of the procedure, including but not limited to, infection, bleeding, injury to the common bile duct and conversion to an open procedure were discussed in detail with the patient via the blue phone . Ms. Robel Montoya understood and wished to proceed. DESCRIPTION OF PROCEDURE:  After consent was obtained, the patient was brought to the operating room where she was placed in the supine position on the operating room table. Following the induction of an adequate level of general anesthesia via the endotracheal tube, compression devices were placed on both lower extremities. The abdomen was prepped with ChloraPrep and draped as a sterile field. Local anesthetic was infiltrated and a small transverse incision below the umbilicus was opened sharply. Using the 5-mm non-bladed dilating trocar, access to the peritoneal cavity was achieved under direct vision. After an adequate level of carbon dioxide pneumoperitoneum had been achieved, the laparoscope was inserted.   Inspection of the peritoneal contents revealed no focal abnormalities. Local anesthetic was infiltrated again and a 12-mm trocar and two 5 mm trocars were placed in the usual locations under direct vision. The operating room table was placed in the reverse Trendelenburg position and attention directed towards the gallbladder. It should be noted that the liver appeared steatotic. The gallbladder was readily identified and appeared thick-walled consistent with chronic cholecystitis. The gallbladder was grasped and attention directed towards the cystic duct. This structure was identified, dissected free circumferentially and followed into the gallbladder. The cystic artery was subsequently identified, dissected free circumferentially and followed into the gallbladder as well. The gallbladder was mobilized and the critical view was obtained. It was then decided to perform an intraoperative cholangiogram.  The Back clamp was brought on the field and placed across the infundibulum of the gallbladder. The catheter was inserted and the cholangiogram was performed. Contrast was noted in the cystic duct, the common bile duct and the duodenum. There were no filling defects consistent with a retained common bile duct stone identified on the study. Furthermore, contrast was noted in the common hepatic duct, the right and left hepatic ducts and the intrahepatic biliary radicles. The catheter was removed and the Back clamp was removed as well. The cystic duct was clipped 3 times proximally and divided. The cystic artery was then divided between 3 clips proximally and 1 distally. Using the hook electrocautery, the gallbladder was taken off of the liver and placed in an EndoCatch bag. The specimen was removed from the peritoneal cavity, passed off the field and submitted for histopathologic evaluation. The gallbladder fossa was inspected and several bleeders cauterized.   The clips on the cystic duct stump and the clips on the cystic artery stump were identified and found to be in place. The gallbladder fossa was irrigated copiously with saline and 3 g of Иван were placed. The peritoneal cavity was inspected again and felt to be hemostatic. No other abnormalities were noted and so the trocars were all removed under direct vision. The pneumoperitoneum was evacuated and the wounds were irrigated copiously with saline. The fascial defect at the 12-mm trocar site was closed with a 0 Vicryl figure-of-eight suture. This incision was closed with two interrupted 0 Vicryl sutures followed by 4-0 Monocryl subcuticular suture to the skin. The three 5-mm trocar sites were closed with 4-0 Monocryl subcuticular suture to the skin. Additional local anesthetic was infiltrated and the surgical incisions were dressed with Dermabond. The patient was awakened from her general anesthetic and extubated in the operating room. She was transferred to the stretcher and brought to the recovery room in stable condition having tolerated the procedure well. At the conclusion of the procedure, all sponge counts, instrument counts and needle counts were reported as correct x2.         Holley Goodrich MD [email or web address]  D:  06/12/2020 13:28  T:  06/12/2020 15:55  JOB #:  [id number]  CC: Reuben Gusman MD

## 2020-06-12 NOTE — ANESTHESIA PREPROCEDURE EVALUATION
Relevant Problems   No relevant active problems       Anesthetic History   No history of anesthetic complications            Review of Systems / Medical History  Patient summary reviewed, nursing notes reviewed and pertinent labs reviewed    Pulmonary  Within defined limits                 Neuro/Psych   Within defined limits           Cardiovascular    Hypertension              Exercise tolerance: >4 METS     GI/Hepatic/Renal  Within defined limits              Endo/Other        Morbid obesity     Other Findings              Physical Exam    Airway  Mallampati: II  TM Distance: > 6 cm  Neck ROM: normal range of motion   Mouth opening: Normal     Cardiovascular  Regular rate and rhythm,  S1 and S2 normal,  no murmur, click, rub, or gallop             Dental  No notable dental hx       Pulmonary  Breath sounds clear to auscultation               Abdominal  GI exam deferred       Other Findings            Anesthetic Plan    ASA: 3  Anesthesia type: general          Induction: Intravenous  Anesthetic plan and risks discussed with: Patient

## 2020-06-12 NOTE — ANESTHESIA POSTPROCEDURE EVALUATION
Post-Anesthesia Evaluation and Assessment    Patient: Lida Peng MRN: [id number]  SSN: [id number]    YOB: 1985  Age: 38 y.o. Sex: female       Cardiovascular Function/Vital Signs  Visit Vitals  /71 (BP 1 Location: Right arm, BP Patient Position: At rest)   Pulse 80   Temp (!) 35.9 °C (96.6 °F)   Resp 19   Ht 5' 5\" (1.651 m)   Wt 114.3 kg (252 lb)   SpO2 96%   BMI 41.93 kg/m²       Patient is status post General anesthesia for Procedure(s):  LAPAROSCOPIC CHOLECYSTECTOMY, INTRAOPERATIVE CHOLANGIOGRAM, POSSIBLE OPEN. Nausea/Vomiting: None    Postoperative hydration reviewed and adequate. Pain:  Pain Scale 1: Numeric (0 - 10) (06/12/20 1345)  Pain Intensity 1: 0 (06/12/20 1345)   Managed    Neurological Status:   Neuro (WDL): Within Defined Limits (06/12/20 1345)   At baseline    Mental Status and Level of Consciousness: Alert and oriented to person, place, and time    Pulmonary Status:   O2 Device: Room air (06/12/20 1345)   Adequate oxygenation and airway patent    Complications related to anesthesia: None    Post-anesthesia assessment completed. No concerns    Signed By: Niurka Felton MD     June 12, 2020              Procedure(s):  LAPAROSCOPIC CHOLECYSTECTOMY, INTRAOPERATIVE CHOLANGIOGRAM, POSSIBLE OPEN. general    <BSHSIANPOST>    INITIAL Post-op Vital signs:   Vitals Value Taken Time   /69 6/12/2020  2:00 PM   Temp 35.9 °C (96.6 °F) 6/12/2020  1:45 PM   Pulse 86 6/12/2020  2:12 PM   Resp 17 6/12/2020  2:12 PM   SpO2 96 % 6/12/2020  2:12 PM   Vitals shown include unvalidated device data.

## 2020-06-12 NOTE — H&P
Information obtained from patient via blue phone . Ms. Jose Dorsey has no c/o today. Afebrile VSS  No intake or output data in the 24 hours ending 06/12/20 1118   Exam: Cor: RRR. Lungs: Bilateral breath sounds. Clear to auscultation. Abd: Soft. Non tender. No guarding or rebound. Non distended. Labs:   Recent Results (from the past 12 hour(s))   GLUCOSE, POC    Collection Time: 06/12/20 11:02 AM   Result Value Ref Range    Glucose (POC) 119 (H) 65 - 100 mg/dL    Performed by Hetal Ashton    HCG URINE, QL. - POC    Collection Time: 06/12/20 11:03 AM   Result Value Ref Range    Pregnancy test,urine (POC) Negative NEG     To OR for laparoscopic cholecystectomy and intra-operative cholangiogram.   Discussed procedure with her, via the blue phone , including risks of bleeding. Infection, CBD injury, conversion to open procedure. She understands and wishes to proceed. Consent on chart. NPO for now.

## 2020-06-12 NOTE — ROUTINE PROCESS
Patient: Zoie Linares MRN: [id number]  SSN: [id number] YOB: 1985  Age: 38 y.o. Sex: female Patient is status post Procedure(s): LAPAROSCOPIC CHOLECYSTECTOMY, INTRAOPERATIVE CHOLANGIOGRAM, POSSIBLE OPEN. Surgeon(s) and Role: 
   Yokasta Armenta MD - Primary Local/Dose/Irrigation:  30 cc Bupivacaine 0.25% Peripheral IV 06/12/20 Left Hand (Active) Site Assessment Clean, dry, & intact 6/12/2020 11:05 AM  
Phlebitis Assessment 0 6/12/2020 11:05 AM  
Infiltration Assessment 0 6/12/2020 11:05 AM  
Dressing Status New 6/12/2020 11:05 AM  
Dressing Type Transparent 6/12/2020 11:05 AM  
Hub Color/Line Status Pink; Infusing 6/12/2020 11:05 AM  
        
Airway - Endotracheal Tube 06/12/20 Oral (Active) Dressing/Packing:  Wound Abdomen-Dressing Type: Topical skin adhesive/glue(Dermabond) (06/12/20 1309) Splint/Cast:  ] Other:  4 closed port sites

## 2020-06-12 NOTE — PERIOP NOTES
1400 Spoke to Dr. Thor Fay regarding continued nausea after second dose of Zofran. Order for Compazine received.

## 2020-06-29 ENCOUNTER — OFFICE VISIT (OUTPATIENT)
Dept: SURGERY | Age: 35
End: 2020-06-29

## 2020-06-29 DIAGNOSIS — Z90.49 S/P LAPAROSCOPIC CHOLECYSTECTOMY: Primary | ICD-10-CM

## 2020-06-29 PROBLEM — K80.20 CALCULUS OF GALLBLADDER WITHOUT CHOLECYSTITIS WITHOUT OBSTRUCTION: Status: RESOLVED | Noted: 2020-03-11 | Resolved: 2020-06-29

## 2020-06-29 NOTE — PROGRESS NOTES
Jocelynn Lou is a 38 y.o. female who returns for post-operative evaluation. Information obtained from patient via blue phone . Ms. Dakotah Matthews is s/p laparoscopic cholecystectomy and intra-operative cholangiogram on 2020. Discharged to home that day. Doing well since then. Still experiencing minimal post-operative pain which is improving. No fevers or chills. Tolerating diet. No nausea or vomitting. No diarrhea. She reports no redness or drainage at surgical sites. She has otherwise been in her usual state of health. Past Medical History:   Diagnosis Date    Acid indigestion     Calculus of gallbladder without cholecystitis without obstruction 3/11/2020    Class 3 severe obesity in adult [facility]) 3/11/2020    Hypertension     S/P laparoscopic cholecystectomy 2020     Past Surgical History:   Procedure Laterality Date    HX  SECTION      X3     Family History   Problem Relation Age of Onset    Hypertension Mother     Diabetes Father      Social History     Socioeconomic History    Marital status:      Spouse name: Not on file    Number of children: Not on file    Years of education: Not on file    Highest education level: Not on file   Tobacco Use    Smoking status: Never Smoker    Smokeless tobacco: Never Used   Substance and Sexual Activity    Alcohol use: No    Drug use: No     Review of systems negative except as noted. Review of Systems   Constitutional: Negative for chills and fever. Gastrointestinal: Positive for abdominal pain (Improving.) and constipation. Negative for diarrhea, nausea and vomiting. Physical Exam  Vitals signs reviewed. Constitutional:       General: She is not in acute distress. Appearance: She is obese. HENT:      Head: Normocephalic and atraumatic. Cardiovascular:      Rate and Rhythm: Normal rate and regular rhythm.    Pulmonary:      Effort: Pulmonary effort is normal.      Breath sounds: Normal breath sounds. Abdominal:      General: There is no distension. Palpations: Abdomen is soft. Tenderness: There is abdominal tenderness (RUQ.). There is no guarding or rebound. Musculoskeletal: Normal range of motion. Neurological:      General: No focal deficit present. Mental Status: She is alert. ASSESSMENT and PLAN  I reviewed the operative findings and pathology with Ms. Oneal Grayson today via the blue phone  and reassured her that she is doing well thus far and that her post-operative pain will continue to improve. Diet and activity as tolerated. Follow up with Dr. Franchesca Martinez as scheduled. Will see as needed.        CC: Carlos Barker MD

## 2022-03-19 PROBLEM — E66.01 CLASS 3 SEVERE OBESITY IN ADULT (HCC): Status: ACTIVE | Noted: 2020-03-11

## 2022-03-20 PROBLEM — Z90.49 S/P LAPAROSCOPIC CHOLECYSTECTOMY: Status: ACTIVE | Noted: 2020-06-29

## 2022-07-17 NOTE — PROGRESS NOTES
1. Have you been to the ER, urgent care clinic since your last visit? Hospitalized since your last visit? No    2. Have you seen or consulted any other health care providers outside of the [address] [place] [address] since your last visit? Include any pap smears or colon screening.  No    [employer] blue phones used to interpret visit confirmation number [id number] Requested Prescriptions     Pending Prescriptions Disp Refills   • DILTIAZem CD (CARTIA XT) 240 MG CAPSULE SR 24 HR 90 Cap 0     Sig: Take 1 Cap by mouth every day.       Genoveva Roach A.P.R.N.     Was the patient seen in the last year in this department? Yes LOV 12/31/18 LABS 2018    Does patient have an active prescription for medications requested? No     Received Request Via: Pharmacy   No

## 2023-05-21 RX ORDER — ONDANSETRON 4 MG/1
4 TABLET, FILM COATED ORAL EVERY 8 HOURS PRN
COMMUNITY

## 2023-05-21 RX ORDER — ACETAMINOPHEN 500 MG
1000 TABLET ORAL EVERY 6 HOURS PRN
COMMUNITY
Start: 2020-06-12

## 2024-08-13 ENCOUNTER — NURSE ONLY (OUTPATIENT)
Age: 39
End: 2024-08-13

## 2024-08-13 NOTE — PROGRESS NOTES
Pt arrived at clinic to complete CRISTEL and request record from 2016 office visit. Name and  verified. CRISTEL completed and signed. Office visit note from 2016 printed and provided to pt per request. Time allowed for questions, no questions answered. Baylee Grijalva RN'

## (undated) DEVICE — GARMENT,MEDLINE,DVT,INT,CALF,MED, GEN2: Brand: MEDLINE

## (undated) DEVICE — Device

## (undated) DEVICE — DISSECTOR LAP DIA5MM BLNT TIP ENDOPATH

## (undated) DEVICE — SURGICAL PROCEDURE KIT GEN LAPAROSCOPY LF

## (undated) DEVICE — CLICKLINE SCISSORS INSERT: Brand: CLICKLINE

## (undated) DEVICE — APPLICATOR SURG L38CM RIG ATOMIZING SGL USE XL-R FLEXITIP

## (undated) DEVICE — TROCAR: Brand: KII® OPTICAL ACCESS SYSTEM

## (undated) DEVICE — DERMABOND SKIN ADH 0.7ML -- DERMABOND ADVANCED 12/BX

## (undated) DEVICE — FILTER SMK EVAC FLO CLR MEGADYNE

## (undated) DEVICE — SYR 20ML LL STRL LF --

## (undated) DEVICE — PMI OPERATIVE CHOLANGIOGRAM CATHETER; TUBING IS 76CM IN LENGTH, 16GA WITH A: Brand: PMI

## (undated) DEVICE — DRAPE,REIN 53X77,STERILE: Brand: MEDLINE

## (undated) DEVICE — DRAPE,UTILTY,TAPE,15X26, 4EA/PK: Brand: MEDLINE

## (undated) DEVICE — SUTURE SZ 0 27IN 5/8 CIR UR-6  TAPER PT VIOLET ABSRB VICRYL J603H

## (undated) DEVICE — DRAPE XR C ARM 41X74IN LF --

## (undated) DEVICE — STOPCOCK IV 3W --

## (undated) DEVICE — TUBING, SUCTION, 1/4" X 12', STRAIGHT: Brand: MEDLINE

## (undated) DEVICE — BAG SPEC REM 224ML W4XL6IN DIA10MM 1 HND GYN DISP ENDOPCH

## (undated) DEVICE — NEEDLE HYPO 25GA L1.5IN BVL ORIENTED ECLIPSE

## (undated) DEVICE — ELECTRODE ES 36CM LAP FLAT L HK COAT DISP CLEANCOAT

## (undated) DEVICE — INFECTION CONTROL KIT SYS

## (undated) DEVICE — BLADE ASSEMB CLP HAIR FINE --

## (undated) DEVICE — AGENT HEMSTAT 3GM PURIFIED PLNT STARCH PWD ABSRB ARISTA AH

## (undated) DEVICE — LAPAROSCOPIC TROCAR SLEEVE/SINGLE USE: Brand: KII® OPTICAL ACCESS SYSTEM

## (undated) DEVICE — SUTURE MCRYL SZ 4-0 L27IN ABSRB UD L19MM PS-2 1/2 CIR PRIM Y426H

## (undated) DEVICE — APPLIER CLP M L L11.4IN DIA10MM ENDOSCP ROT MULT FOR LIG

## (undated) DEVICE — REM POLYHESIVE ADULT PATIENT RETURN ELECTRODE: Brand: VALLEYLAB

## (undated) DEVICE — STRAP,POSITIONING,KNEE/BODY,FOAM,4X60": Brand: MEDLINE

## (undated) DEVICE — PREP SKN CHLRAPRP APL 26ML STR --

## (undated) DEVICE — STERILE POLYISOPRENE POWDER-FREE SURGICAL GLOVES WITH EMOLLIENT COATING: Brand: PROTEXIS